# Patient Record
Sex: MALE | Employment: OTHER | URBAN - METROPOLITAN AREA
[De-identification: names, ages, dates, MRNs, and addresses within clinical notes are randomized per-mention and may not be internally consistent; named-entity substitution may affect disease eponyms.]

---

## 2022-02-08 ENCOUNTER — OFFICE VISIT (OUTPATIENT)
Dept: FAMILY MEDICINE CLINIC | Facility: CLINIC | Age: 32
End: 2022-02-08
Payer: COMMERCIAL

## 2022-02-08 VITALS
OXYGEN SATURATION: 97 % | BODY MASS INDEX: 24.91 KG/M2 | HEART RATE: 78 BPM | HEIGHT: 70 IN | WEIGHT: 174 LBS | SYSTOLIC BLOOD PRESSURE: 124 MMHG | DIASTOLIC BLOOD PRESSURE: 76 MMHG

## 2022-02-08 DIAGNOSIS — R51.9 CHRONIC NONINTRACTABLE HEADACHE, UNSPECIFIED HEADACHE TYPE: ICD-10-CM

## 2022-02-08 DIAGNOSIS — M54.50 CHRONIC BILATERAL LOW BACK PAIN WITHOUT SCIATICA: ICD-10-CM

## 2022-02-08 DIAGNOSIS — Z00.00 ENCOUNTER FOR ROUTINE ADULT HEALTH EXAMINATION WITHOUT ABNORMAL FINDINGS: Primary | ICD-10-CM

## 2022-02-08 DIAGNOSIS — M21.41 PES PLANUS OF BOTH FEET: ICD-10-CM

## 2022-02-08 DIAGNOSIS — G89.29 CHRONIC BILATERAL LOW BACK PAIN WITHOUT SCIATICA: ICD-10-CM

## 2022-02-08 DIAGNOSIS — M21.42 PES PLANUS OF BOTH FEET: ICD-10-CM

## 2022-02-08 DIAGNOSIS — G89.29 CHRONIC NONINTRACTABLE HEADACHE, UNSPECIFIED HEADACHE TYPE: ICD-10-CM

## 2022-02-08 PROCEDURE — 99385 PREV VISIT NEW AGE 18-39: CPT | Performed by: FAMILY MEDICINE

## 2022-02-08 PROCEDURE — 3008F BODY MASS INDEX DOCD: CPT | Performed by: FAMILY MEDICINE

## 2022-02-08 PROCEDURE — 3074F SYST BP LT 130 MM HG: CPT | Performed by: FAMILY MEDICINE

## 2022-02-08 PROCEDURE — 99202 OFFICE O/P NEW SF 15 MIN: CPT | Performed by: FAMILY MEDICINE

## 2022-02-08 PROCEDURE — 3078F DIAST BP <80 MM HG: CPT | Performed by: FAMILY MEDICINE

## 2024-12-18 ENCOUNTER — OFFICE VISIT (OUTPATIENT)
Facility: CLINIC | Age: 34
End: 2024-12-18
Payer: COMMERCIAL

## 2024-12-18 VITALS
OXYGEN SATURATION: 98 % | HEIGHT: 70 IN | SYSTOLIC BLOOD PRESSURE: 126 MMHG | WEIGHT: 182 LBS | BODY MASS INDEX: 26.05 KG/M2 | DIASTOLIC BLOOD PRESSURE: 84 MMHG | HEART RATE: 91 BPM

## 2024-12-18 DIAGNOSIS — Z00.00 ENCOUNTER FOR ROUTINE ADULT HEALTH EXAMINATION WITHOUT ABNORMAL FINDINGS: Primary | ICD-10-CM

## 2024-12-18 DIAGNOSIS — Z91.018 FOOD ALLERGY: ICD-10-CM

## 2024-12-18 DIAGNOSIS — R07.9 CHEST PAIN, UNSPECIFIED TYPE: ICD-10-CM

## 2024-12-18 DIAGNOSIS — F41.9 ANXIETY: ICD-10-CM

## 2024-12-18 NOTE — PROGRESS NOTES
Luis E Trujillo is a 34 year old male who presents for a complete physical exam.   HPI:     Concerns: He has developed a couple of food allergies, and these are new. One of the allergies is to yeast, and the other is fresh garlic and dairy products. He will feel sick and fatigued, and taking Tylenol helps. He does not get any rash, swelling, nausea, or vomiting.   Has also had some anxiety, but it is mostly immigration related. He will drink some Chinese teas, which does help. He does also tend to get angry easier at times.  He has also had some slight \"heart pain\" if he lifts something heavy for longer periods of time. Laying down or drinking tea helps the pain.   Diet/exercise: Breakfast is a skillet with potatoes, ayala, mushrooms, and cheese. Also has cherry tomatoes. Will drink black tea with milk, sugar, and cardamon seeds. Lunch is chicken schnitzel with rice and avocado with fresh tomatoes. Also has a cup of green tea. Dinner is a meat with mashed potatoes or rice with seafood. Drinks mostly tea. Walks with her daughter, but no other formal exercise.    Hx of STI screening: Never screened  Substance abuse: None  Vaccines- Tdap: 2020, Flu: Declines, Covid: Completed 2 doses    REVIEW OF SYSTEMS:   GENERAL: feels well otherwise   SKIN: denies any unusual skin lesions  EYES:denies vision change  HEENT: no hearing changes  LUNGS: denies shortness of breath with exertion  CARDIOVASCULAR: intermittent chest pain on exertion  GI: denies abdominal pain, constipation or diarrhea, no hematochezia or melena   : denies nocturia or changes in stream  NEURO: denies headaches  PSYCHE: denies depression but anxiety    No current outpatient medications on file.      Past Medical History:    H. pylori infection    Pes planus      History reviewed. No pertinent surgical history.   Family History   Problem Relation Age of Onset    Hypertension Father     Other (Other) Father         smoking    Hypertension Mother        Social History:  Social History     Socioeconomic History    Marital status:    Tobacco Use    Smoking status: Never    Smokeless tobacco: Never   Vaping Use    Vaping status: Never Used   Substance and Sexual Activity    Alcohol use: Yes     Alcohol/week: 1.0 standard drink of alcohol     Types: 1 Shots of liquor per week    Drug use: Never    Sexual activity: Yes     Partners: Female   Other Topics Concern    Caffeine Concern No    Exercise No    Seat Belt No    Special Diet No    Stress Concern No    Weight Concern No     Social Drivers of Health      Received from [facility]    Social Connections    Received from [facility]    Housing Stability      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    /84   Pulse 91   Ht 5' 10\" (1.778 m)   Wt 182 lb (82.6 kg)   SpO2 98%   BMI 26.11 kg/m²      GENERAL: well developed, well nourished,in no apparent distress  SKIN: no rashes,no suspicious lesions  HEENT: atraumatic, normocephalic  EYES: PERRLA, EOMI  NECK: supple,no adenopathy   LUNGS: clear to auscultation  CARDIO: RRR without murmur  GI: good bowel sounds,no masses, HSM or tenderness  EXTREMITIES: no cyanosis, clubbing or edema  NEURO: Oriented times three, cranial nerves are intact, motor and sensory are grossly intact    No results found for: \"CHOLEST\", \"HDL\", \"LDL\", \"TRIGLY\", \"AST\", \"ALT\"   ASSESSMENT AND PLAN:   Luis E Trujillo is a 34 year old male who presents for a complete physical exam.    Encounter Diagnoses   Name Primary?    Encounter for routine adult health examination without abnormal findings Yes    Chest pain, unspecified type     Food allergy     Anxiety      Orders Placed This Encounter   Procedures    CBC With Differential With Platelet    Comp  Metabolic Panel (14)    Hemoglobin A1C    Lipid Panel    HCV Antibody [E]     Will check EKG and labs due to intermittent chest pain with lifting for longer periods of time, though sounds more musculoskeletal and less likely cardiac. Reviewed warning signs and ED precautions, and will consider cardiology referral.  Referral to allergist given for evaluation of possible food allergies.  Consider seeing a therapist if anxiety not improving or worsening, but will monitor for now.     Discussed with patient the following:  -Prevention of skin cancer  -Healthy diet including adequate intake of vegetables and fruits, appropriate portion sizes, minimizing highly concentrated carbohydrate foods  -Exercising 30 minutes a day most days of the week   -Importance of regular exercise and weight loss  -Diabetes screening which he desires  -Cholesterol screening which he desires  -Recommendation for yearly influenza vaccine  -Need for Tdap once as an adult and Td booster every 10 years  -STI screening (GC/Chlamydia/HIV): not indicated  -Hepatitis C screening for all adults between the ages of 18 and 79: Check with labs    Meds & Refills for this Visit:  Requested Prescriptions      No prescriptions requested or ordered in this encounter       Imaging & Consults:  ALLERGY - INTERNAL  EKG 12-LEAD    Return in 1 year for annual physical or sooner as needed.     Mariely Gupta DO  12/18/24   4:43 PM